# Patient Record
Sex: MALE | Race: BLACK OR AFRICAN AMERICAN | NOT HISPANIC OR LATINO | URBAN - METROPOLITAN AREA
[De-identification: names, ages, dates, MRNs, and addresses within clinical notes are randomized per-mention and may not be internally consistent; named-entity substitution may affect disease eponyms.]

---

## 2017-01-24 ENCOUNTER — INPATIENT (INPATIENT)
Facility: HOSPITAL | Age: 82
LOS: 4 days | Discharge: HOME HEALTH | DRG: 871 | End: 2017-01-29
Attending: FAMILY MEDICINE | Admitting: HOSPITALIST
Payer: MEDICARE

## 2017-01-24 VITALS
RESPIRATION RATE: 18 BRPM | DIASTOLIC BLOOD PRESSURE: 53 MMHG | HEART RATE: 56 BPM | TEMPERATURE: 99 F | WEIGHT: 162.04 LBS | SYSTOLIC BLOOD PRESSURE: 78 MMHG | OXYGEN SATURATION: 100 %

## 2017-01-24 LAB
ALBUMIN SERPL ELPH-MCNC: 3.5 G/DL — SIGNIFICANT CHANGE UP (ref 3.3–5.2)
ALP SERPL-CCNC: 92 U/L — SIGNIFICANT CHANGE UP (ref 40–120)
ALT FLD-CCNC: <5 U/L — SIGNIFICANT CHANGE UP
ANION GAP SERPL CALC-SCNC: 17 MMOL/L — SIGNIFICANT CHANGE UP (ref 5–17)
ANISOCYTOSIS BLD QL: SLIGHT — SIGNIFICANT CHANGE UP
APPEARANCE UR: ABNORMAL
AST SERPL-CCNC: 13 U/L — SIGNIFICANT CHANGE UP
BACTERIA # UR AUTO: ABNORMAL
BILIRUB SERPL-MCNC: 0.6 MG/DL — SIGNIFICANT CHANGE UP (ref 0.4–2)
BILIRUB UR-MCNC: NEGATIVE — SIGNIFICANT CHANGE UP
BUN SERPL-MCNC: 23 MG/DL — HIGH (ref 8–20)
CALCIUM SERPL-MCNC: 9.1 MG/DL — SIGNIFICANT CHANGE UP (ref 8.6–10.2)
CHLORIDE SERPL-SCNC: 93 MMOL/L — LOW (ref 98–107)
CK SERPL-CCNC: 46 U/L — SIGNIFICANT CHANGE UP (ref 30–200)
CO2 SERPL-SCNC: 31 MMOL/L — HIGH (ref 22–29)
COLOR SPEC: YELLOW — SIGNIFICANT CHANGE UP
CREAT SERPL-MCNC: 2.99 MG/DL — HIGH (ref 0.5–1.3)
DIFF PNL FLD: ABNORMAL
EOSINOPHIL NFR BLD AUTO: 2 % — SIGNIFICANT CHANGE UP (ref 0–6)
GLUCOSE SERPL-MCNC: 154 MG/DL — HIGH (ref 70–115)
GLUCOSE UR QL: NEGATIVE MG/DL — SIGNIFICANT CHANGE UP
HCT VFR BLD CALC: 30.8 % — LOW (ref 42–52)
HGB BLD-MCNC: 9.6 G/DL — LOW (ref 14–18)
HYPOCHROMIA BLD QL: SLIGHT — SIGNIFICANT CHANGE UP
INR BLD: 1.19 RATIO — HIGH (ref 0.88–1.16)
KETONES UR-MCNC: ABNORMAL
LEUKOCYTE ESTERASE UR-ACNC: ABNORMAL
LYMPHOCYTES # BLD AUTO: 19 % — LOW (ref 20–55)
MACROCYTES BLD QL: SLIGHT — SIGNIFICANT CHANGE UP
MCHC RBC-ENTMCNC: 31.2 G/DL — LOW (ref 32–36)
MCHC RBC-ENTMCNC: 31.2 PG — HIGH (ref 27–31)
MCV RBC AUTO: 100 FL — HIGH (ref 80–94)
MICROCYTES BLD QL: SLIGHT — SIGNIFICANT CHANGE UP
MONOCYTES NFR BLD AUTO: 10 % — SIGNIFICANT CHANGE UP (ref 3–10)
NEUTROPHILS NFR BLD AUTO: 45 % — SIGNIFICANT CHANGE UP (ref 37–73)
NITRITE UR-MCNC: NEGATIVE — SIGNIFICANT CHANGE UP
NRBC # BLD: 1 /100 — HIGH (ref 0–0)
PH UR: 8 — SIGNIFICANT CHANGE UP (ref 4.8–8)
PLAT MORPH BLD: NORMAL — SIGNIFICANT CHANGE UP
PLATELET # BLD AUTO: 155 K/UL — SIGNIFICANT CHANGE UP (ref 150–400)
POIKILOCYTOSIS BLD QL AUTO: SLIGHT — SIGNIFICANT CHANGE UP
POTASSIUM SERPL-MCNC: 3.9 MMOL/L — SIGNIFICANT CHANGE UP (ref 3.5–5.3)
POTASSIUM SERPL-SCNC: 3.9 MMOL/L — SIGNIFICANT CHANGE UP (ref 3.5–5.3)
PROT SERPL-MCNC: 6.9 G/DL — SIGNIFICANT CHANGE UP (ref 6.6–8.7)
PROT UR-MCNC: 100 MG/DL
PROTHROM AB SERPL-ACNC: 13.1 SEC — SIGNIFICANT CHANGE UP (ref 10–13.1)
RBC # BLD: 3.08 M/UL — LOW (ref 4.6–6.2)
RBC # FLD: 13.8 % — SIGNIFICANT CHANGE UP (ref 11–15.6)
RBC BLD AUTO: ABNORMAL
RBC CASTS # UR COMP ASSIST: ABNORMAL /HPF (ref 0–4)
SODIUM SERPL-SCNC: 141 MMOL/L — SIGNIFICANT CHANGE UP (ref 135–145)
SP GR SPEC: 1.01 — SIGNIFICANT CHANGE UP (ref 1.01–1.02)
TROPONIN T SERPL-MCNC: 0.04 NG/ML — SIGNIFICANT CHANGE UP (ref 0–0.06)
UROBILINOGEN FLD QL: 4 MG/DL
VARIANT LYMPHS # BLD: 24 % — HIGH (ref 0–6)
WBC # BLD: 23.36 K/UL — HIGH (ref 4.8–10.8)
WBC # FLD AUTO: 23.36 K/UL — HIGH (ref 4.8–10.8)
WBC UR QL: >50

## 2017-01-24 PROCEDURE — 71010: CPT | Mod: 26

## 2017-01-24 PROCEDURE — 99285 EMERGENCY DEPT VISIT HI MDM: CPT

## 2017-01-24 PROCEDURE — 70450 CT HEAD/BRAIN W/O DYE: CPT | Mod: 26

## 2017-01-24 RX ORDER — CEFTRIAXONE 500 MG/1
1 INJECTION, POWDER, FOR SOLUTION INTRAMUSCULAR; INTRAVENOUS EVERY 24 HOURS
Qty: 0 | Refills: 0 | Status: DISCONTINUED | OUTPATIENT
Start: 2017-01-25 | End: 2017-01-29

## 2017-01-24 RX ORDER — FOLIC ACID 0.8 MG
1 TABLET ORAL DAILY
Qty: 0 | Refills: 0 | Status: DISCONTINUED | OUTPATIENT
Start: 2017-01-24 | End: 2017-01-29

## 2017-01-24 RX ORDER — CEFTRIAXONE 500 MG/1
INJECTION, POWDER, FOR SOLUTION INTRAMUSCULAR; INTRAVENOUS
Qty: 0 | Refills: 0 | Status: DISCONTINUED | OUTPATIENT
Start: 2017-01-24 | End: 2017-01-29

## 2017-01-24 RX ORDER — ATORVASTATIN CALCIUM 80 MG/1
40 TABLET, FILM COATED ORAL AT BEDTIME
Qty: 0 | Refills: 0 | Status: DISCONTINUED | OUTPATIENT
Start: 2017-01-24 | End: 2017-01-29

## 2017-01-24 RX ORDER — FINASTERIDE 5 MG/1
5 TABLET, FILM COATED ORAL DAILY
Qty: 0 | Refills: 0 | Status: DISCONTINUED | OUTPATIENT
Start: 2017-01-24 | End: 2017-01-29

## 2017-01-24 RX ORDER — HEPARIN SODIUM 5000 [USP'U]/ML
5000 INJECTION INTRAVENOUS; SUBCUTANEOUS EVERY 8 HOURS
Qty: 0 | Refills: 0 | Status: DISCONTINUED | OUTPATIENT
Start: 2017-01-24 | End: 2017-01-29

## 2017-01-24 RX ORDER — CEFTRIAXONE 500 MG/1
1 INJECTION, POWDER, FOR SOLUTION INTRAMUSCULAR; INTRAVENOUS ONCE
Qty: 0 | Refills: 0 | Status: COMPLETED | OUTPATIENT
Start: 2017-01-24 | End: 2017-01-24

## 2017-01-24 RX ORDER — GLUCAGON INJECTION, SOLUTION 0.5 MG/.1ML
1 INJECTION, SOLUTION SUBCUTANEOUS ONCE
Qty: 0 | Refills: 0 | Status: DISCONTINUED | OUTPATIENT
Start: 2017-01-24 | End: 2017-01-29

## 2017-01-24 RX ORDER — SODIUM CHLORIDE 9 MG/ML
3 INJECTION INTRAMUSCULAR; INTRAVENOUS; SUBCUTANEOUS ONCE
Qty: 0 | Refills: 0 | Status: COMPLETED | OUTPATIENT
Start: 2017-01-24 | End: 2017-01-24

## 2017-01-24 RX ORDER — INSULIN LISPRO 100/ML
VIAL (ML) SUBCUTANEOUS
Qty: 0 | Refills: 0 | Status: DISCONTINUED | OUTPATIENT
Start: 2017-01-24 | End: 2017-01-29

## 2017-01-24 RX ORDER — ONDANSETRON 8 MG/1
4 TABLET, FILM COATED ORAL EVERY 6 HOURS
Qty: 0 | Refills: 0 | Status: DISCONTINUED | OUTPATIENT
Start: 2017-01-24 | End: 2017-01-29

## 2017-01-24 RX ORDER — SODIUM CHLORIDE 9 MG/ML
1000 INJECTION, SOLUTION INTRAVENOUS
Qty: 0 | Refills: 0 | Status: DISCONTINUED | OUTPATIENT
Start: 2017-01-24 | End: 2017-01-29

## 2017-01-24 RX ORDER — SODIUM CHLORIDE 9 MG/ML
3 INJECTION INTRAMUSCULAR; INTRAVENOUS; SUBCUTANEOUS EVERY 8 HOURS
Qty: 0 | Refills: 0 | Status: DISCONTINUED | OUTPATIENT
Start: 2017-01-24 | End: 2017-01-29

## 2017-01-24 RX ORDER — ASPIRIN/CALCIUM CARB/MAGNESIUM 324 MG
81 TABLET ORAL DAILY
Qty: 0 | Refills: 0 | Status: DISCONTINUED | OUTPATIENT
Start: 2017-01-24 | End: 2017-01-29

## 2017-01-24 RX ORDER — DEXTROSE 50 % IN WATER 50 %
12.5 SYRINGE (ML) INTRAVENOUS ONCE
Qty: 0 | Refills: 0 | Status: DISCONTINUED | OUTPATIENT
Start: 2017-01-24 | End: 2017-01-29

## 2017-01-24 RX ORDER — DEXTROSE 50 % IN WATER 50 %
25 SYRINGE (ML) INTRAVENOUS ONCE
Qty: 0 | Refills: 0 | Status: DISCONTINUED | OUTPATIENT
Start: 2017-01-24 | End: 2017-01-29

## 2017-01-24 RX ORDER — LACTOBACILLUS ACIDOPHILUS 100MM CELL
1 CAPSULE ORAL
Qty: 0 | Refills: 0 | Status: DISCONTINUED | OUTPATIENT
Start: 2017-01-24 | End: 2017-01-26

## 2017-01-24 RX ORDER — DEXTROSE 50 % IN WATER 50 %
1 SYRINGE (ML) INTRAVENOUS ONCE
Qty: 0 | Refills: 0 | Status: DISCONTINUED | OUTPATIENT
Start: 2017-01-24 | End: 2017-01-29

## 2017-01-24 RX ADMIN — SODIUM CHLORIDE 3 MILLILITER(S): 9 INJECTION INTRAMUSCULAR; INTRAVENOUS; SUBCUTANEOUS at 21:56

## 2017-01-24 NOTE — ED PROVIDER NOTE - OBJECTIVE STATEMENT
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 Family reports that pt was at dialysis today and was told to come to the ED because he was confused. Family reports that pt has needed more reminding lately and wakes up sporadically throughout the night forgetting what time of day it is. Last night, family reports that pt woke up at 2300 ready for dialysis. At present, pt denies fever, chills, N/V, SOB. During exam blood pressure is 118/56  PMD: Dr. Chan  dialysis: Dr. Varela 10 years TRS   Cardiology: Dr. Joel

## 2017-01-24 NOTE — ED PROVIDER NOTE - PSH
AV fistula infection    Av Fistula Occlusion    H/o Cataract    Insertion dialysis AV graft 3/14/12  right upper arm    left AV fistula with jugular vein bypass    Pacemaker
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
## 2017-01-24 NOTE — ED PROVIDER NOTE - NS ED MD SCRIBE ATTENDING SCRIBE SECTIONS
PHYSICAL EXAM/INTAKE ASSESSMENT/SCREENINGS/HISTORY OF PRESENT ILLNESS/REVIEW OF SYSTEMS/DISPOSITION/PAST MEDICAL/SURGICAL/SOCIAL HISTORY/HIV/VITAL SIGNS( Pullset)

## 2017-01-24 NOTE — ED PROVIDER NOTE - PMH
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
## 2017-01-25 DIAGNOSIS — N39.0 URINARY TRACT INFECTION, SITE NOT SPECIFIED: ICD-10-CM

## 2017-01-25 DIAGNOSIS — E78.5 HYPERLIPIDEMIA, UNSPECIFIED: ICD-10-CM

## 2017-01-25 DIAGNOSIS — N40.0 BENIGN PROSTATIC HYPERPLASIA WITHOUT LOWER URINARY TRACT SYMPTOMS: ICD-10-CM

## 2017-01-25 DIAGNOSIS — A41.9 SEPSIS, UNSPECIFIED ORGANISM: ICD-10-CM

## 2017-01-25 DIAGNOSIS — R41.82 ALTERED MENTAL STATUS, UNSPECIFIED: ICD-10-CM

## 2017-01-25 DIAGNOSIS — K21.9 GASTRO-ESOPHAGEAL REFLUX DISEASE WITHOUT ESOPHAGITIS: ICD-10-CM

## 2017-01-25 DIAGNOSIS — N18.6 END STAGE RENAL DISEASE: ICD-10-CM

## 2017-01-25 DIAGNOSIS — I15.1 HYPERTENSION SECONDARY TO OTHER RENAL DISORDERS: ICD-10-CM

## 2017-01-25 DIAGNOSIS — E11.29 TYPE 2 DIABETES MELLITUS WITH OTHER DIABETIC KIDNEY COMPLICATION: ICD-10-CM

## 2017-01-25 DIAGNOSIS — C85.90 NON-HODGKIN LYMPHOMA, UNSPECIFIED, UNSPECIFIED SITE: ICD-10-CM

## 2017-01-25 DIAGNOSIS — I10 ESSENTIAL (PRIMARY) HYPERTENSION: ICD-10-CM

## 2017-01-25 LAB
ANION GAP SERPL CALC-SCNC: 12 MMOL/L — SIGNIFICANT CHANGE UP (ref 5–17)
BASOPHILS # BLD AUTO: 0.1 K/UL — SIGNIFICANT CHANGE UP (ref 0–0.2)
BASOPHILS NFR BLD AUTO: 0.3 % — SIGNIFICANT CHANGE UP (ref 0–2)
BUN SERPL-MCNC: 28 MG/DL — HIGH (ref 8–20)
CALCIUM SERPL-MCNC: 8.3 MG/DL — LOW (ref 8.6–10.2)
CHLORIDE SERPL-SCNC: 98 MMOL/L — SIGNIFICANT CHANGE UP (ref 98–107)
CO2 SERPL-SCNC: 31 MMOL/L — HIGH (ref 22–29)
CREAT SERPL-MCNC: 3.85 MG/DL — HIGH (ref 0.5–1.3)
EOSINOPHIL # BLD AUTO: 0.2 K/UL — SIGNIFICANT CHANGE UP (ref 0–0.5)
EOSINOPHIL NFR BLD AUTO: 2 % — SIGNIFICANT CHANGE UP (ref 0–6)
GLUCOSE SERPL-MCNC: 100 MG/DL — SIGNIFICANT CHANGE UP (ref 70–115)
HBA1C BLD-MCNC: 6.3 % — HIGH (ref 4–5.6)
HCT VFR BLD CALC: 26.4 % — LOW (ref 42–52)
HGB BLD-MCNC: 8.2 G/DL — LOW (ref 14–18)
LACTATE BLDV-MCNC: 0.9 MMOL/L — SIGNIFICANT CHANGE UP (ref 0.7–2)
LYMPHOCYTES # BLD AUTO: 59 % — HIGH (ref 20–55)
MAGNESIUM SERPL-MCNC: 2.1 MG/DL — SIGNIFICANT CHANGE UP (ref 1.8–2.5)
MCHC RBC-ENTMCNC: 30.8 PG — SIGNIFICANT CHANGE UP (ref 27–31)
MCHC RBC-ENTMCNC: 31.1 G/DL — LOW (ref 32–36)
MCV RBC AUTO: 99.2 FL — HIGH (ref 80–94)
MONOCYTES NFR BLD AUTO: 9 % — SIGNIFICANT CHANGE UP (ref 3–10)
NEUTROPHILS NFR BLD AUTO: 15 % — LOW (ref 37–73)
PHOSPHATE SERPL-MCNC: 3.5 MG/DL — SIGNIFICANT CHANGE UP (ref 2.4–4.7)
PLAT MORPH BLD: NORMAL — SIGNIFICANT CHANGE UP
PLATELET # BLD AUTO: 138 K/UL — LOW (ref 150–400)
POTASSIUM SERPL-MCNC: 3.7 MMOL/L — SIGNIFICANT CHANGE UP (ref 3.5–5.3)
POTASSIUM SERPL-SCNC: 3.7 MMOL/L — SIGNIFICANT CHANGE UP (ref 3.5–5.3)
RBC # BLD: 2.66 M/UL — LOW (ref 4.6–6.2)
RBC # FLD: 14 % — SIGNIFICANT CHANGE UP (ref 11–15.6)
RBC BLD AUTO: ABNORMAL
SODIUM SERPL-SCNC: 141 MMOL/L — SIGNIFICANT CHANGE UP (ref 135–145)
VARIANT LYMPHS # BLD: 15 % — HIGH (ref 0–6)
WBC # BLD: 23.9 K/UL — HIGH (ref 4.8–10.8)
WBC # FLD AUTO: 23.9 K/UL — HIGH (ref 4.8–10.8)

## 2017-01-25 PROCEDURE — 99233 SBSQ HOSP IP/OBS HIGH 50: CPT

## 2017-01-25 PROCEDURE — 93010 ELECTROCARDIOGRAM REPORT: CPT

## 2017-01-25 RX ADMIN — CEFTRIAXONE 100 GRAM(S): 500 INJECTION, POWDER, FOR SOLUTION INTRAMUSCULAR; INTRAVENOUS at 23:42

## 2017-01-25 RX ADMIN — Medication 2: at 12:41

## 2017-01-25 RX ADMIN — SODIUM CHLORIDE 3 MILLILITER(S): 9 INJECTION INTRAMUSCULAR; INTRAVENOUS; SUBCUTANEOUS at 21:47

## 2017-01-25 RX ADMIN — HEPARIN SODIUM 5000 UNIT(S): 5000 INJECTION INTRAVENOUS; SUBCUTANEOUS at 14:58

## 2017-01-25 RX ADMIN — SODIUM CHLORIDE 3 MILLILITER(S): 9 INJECTION INTRAMUSCULAR; INTRAVENOUS; SUBCUTANEOUS at 15:00

## 2017-01-25 RX ADMIN — CEFTRIAXONE 100 GRAM(S): 500 INJECTION, POWDER, FOR SOLUTION INTRAMUSCULAR; INTRAVENOUS at 00:22

## 2017-01-25 RX ADMIN — Medication 1 MILLIGRAM(S): at 11:43

## 2017-01-25 RX ADMIN — Medication 1 TABLET(S): at 10:04

## 2017-01-25 RX ADMIN — FINASTERIDE 5 MILLIGRAM(S): 5 TABLET, FILM COATED ORAL at 11:43

## 2017-01-25 RX ADMIN — HEPARIN SODIUM 5000 UNIT(S): 5000 INJECTION INTRAVENOUS; SUBCUTANEOUS at 23:38

## 2017-01-25 RX ADMIN — Medication 1 TABLET(S): at 18:41

## 2017-01-25 RX ADMIN — SODIUM CHLORIDE 3 MILLILITER(S): 9 INJECTION INTRAMUSCULAR; INTRAVENOUS; SUBCUTANEOUS at 05:30

## 2017-01-25 RX ADMIN — Medication 81 MILLIGRAM(S): at 11:43

## 2017-01-25 RX ADMIN — ATORVASTATIN CALCIUM 40 MILLIGRAM(S): 80 TABLET, FILM COATED ORAL at 23:39

## 2017-01-25 RX ADMIN — HEPARIN SODIUM 5000 UNIT(S): 5000 INJECTION INTRAVENOUS; SUBCUTANEOUS at 05:53

## 2017-01-25 NOTE — CONSULT NOTE ADULT - ASSESSMENT
ESRD on HD has been tolerating ok on TTS schedule  I obtained consent in ED no need for HD today will arrange for HD yasmany  BP on low side will be cautious with UF removal  Electrolytes volume status ok  Confusion possible due to UTI but has worsening   for past 3-4  wks would consider Neuro eval

## 2017-01-25 NOTE — ED ADULT NURSE REASSESSMENT NOTE - NS ED NURSE REASSESS COMMENT FT1
IV placed, bloods sent to lab, 1 liter of NS infusing-Low BP, patient placed on cardiac monitor, will continue to monitor.
Pt resting comfortably on stretcher, resp even and unlabored, color good, showing NSR on monitor with occasional PVC's, MD Wagner aware, no intervention at this time, denies pain, offers no complaints, pt aware of plan of care, awaiting admit bed, will continue to monitor
Pt with patent AV fistula in right upper extremity, positive bruit/thrill present, no edema present in lower extremities, pt showing NSR on monitor, family at bedside, pt and family aware of plan of care, will continue to monitor
Pt received A&OX3,denies pain.  VSS.  CM in place.  NSR.  Clear bsb, abd soft nondistended, nontender, moving all ext well.  RAVF, positive bruit & thrill.  SL in LAC, patent, dsg dry and intact.

## 2017-01-25 NOTE — H&P ADULT. - ASSESSMENT
88 y/o male with UTI, sepsis, intermittent confusion, ESRD on HD, HTN, HLD, DM, RHD, GERD, Prostate cancer, CLL

## 2017-01-25 NOTE — PROGRESS NOTE ADULT - SUBJECTIVE AND OBJECTIVE BOX
INTERVAL HPI/OVERNIGHT EVENTS:    CC: sepsis secondary to UTI, ESRD on HD    Chart and course reviewed, admitted for confusion noted by family. At this time AO x3, denies complaints. Reports having HD yesterday.     Vital Signs Last 24 Hrs  T(C): 36.9, Max: 37.1 ( @ 17:33)  T(F): 98.5, Max: 98.8 ( @ 01:00)  HR: 97 (56 - 97)  BP: 104/53 (78/53 - 123/60)  BP(mean): --  RR: 20 (18 - 20)  SpO2: 98% (95% - 100%)    PHYSICAL EXAM:    GENERAL: Not in distress. alert  CHEST/LUNG: b/l air entry, clear  HEART: Regular  ABDOMEN: Soft, BS+  EXTREMITIES:  No edema    MEDICATIONS  (STANDING):  heparin  Injectable 5000Unit(s) SubCutaneous every 8 hours  sodium chloride 0.9% lock flush 3milliLiter(s) IV Push every 8 hours  insulin lispro (HumaLOG) corrective regimen sliding scale  SubCutaneous Before meals and at bedtime  dextrose 5%. 1000milliLiter(s) IV Continuous <Continuous>  dextrose 50% Injectable 12.5Gram(s) IV Push once  dextrose 50% Injectable 25Gram(s) IV Push once  dextrose 50% Injectable 25Gram(s) IV Push once  cefTRIAXone   IVPB  IV Intermittent   lactobacillus acidophilus 1Tablet(s) Oral two times a day with meals  cefTRIAXone   IVPB 1Gram(s) IV Intermittent every 24 hours  atorvastatin 40milliGRAM(s) Oral at bedtime  finasteride 5milliGRAM(s) Oral daily  aspirin enteric coated 81milliGRAM(s) Oral daily  folic acid 1milliGRAM(s) Oral daily    MEDICATIONS  (PRN):  ondansetron Injectable 4milliGRAM(s) IV Push every 6 hours PRN Nausea  dextrose Gel 1Dose(s) Oral once PRN Blood Glucose LESS THAN 70 milliGRAM(s)/deciliter  glucagon  Injectable 1milliGRAM(s) IntraMuscular once PRN Glucose LESS THAN 70 milligrams/deciliter      Allergies    No Known Allergies    Intolerances          LABS:                          8.2    23.90 )-----------( 138      ( 2017 05:38 )             26.4     2017 05:38    141    |  98     |  28.0   ----------------------------<  100    3.7     |  31.0   |  3.85     Ca    8.3        2017 05:38  Phos  3.5       2017 05:38  Mg     2.1       2017 05:38    TPro  6.9    /  Alb  3.5    /  TBili  0.6    /  DBili  x      /  AST  13     /  ALT  <5     /  AlkPhos  92     2017 20:17    PT/INR - ( 2017 20:17 )   PT: 13.1 sec;   INR: 1.19 ratio           Urinalysis Basic - ( 2017 23:13 )    Color: Yellow / Appearance: very cloudy / S.010 / pH: x  Gluc: x / Ketone: Trace  / Bili: Negative / Urobili: 4 mg/dL   Blood: x / Protein: 100 mg/dL / Nitrite: Negative   Leuk Esterase: Moderate / RBC: 11-25 /HPF / WBC >50   Sq Epi: x / Non Sq Epi: x / Bacteria: TNTC        RADIOLOGY & ADDITIONAL TESTS:

## 2017-01-25 NOTE — H&P ADULT. - NEUROLOGICAL DETAILS
responds to verbal commands/alert and oriented x 3/sensation intact/cranial nerves intact/responds to pain
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
## 2017-01-25 NOTE — PROGRESS NOTE ADULT - ASSESSMENT
89 yr old male with  RHD, s/p PPM, ESRD on HD via right UE AVG, HTN, HLD, DM-2, Prostate cancer s/p XRT in past, CLL admitted for confusion, noted to have sepsis secondary to UTI. He was hypotensive in the ED and received fluid boluses, BP improved post bolus. Cultures sent.

## 2017-01-26 DIAGNOSIS — R41.82 ALTERED MENTAL STATUS, UNSPECIFIED: ICD-10-CM

## 2017-01-26 LAB
ANION GAP SERPL CALC-SCNC: 14 MMOL/L — SIGNIFICANT CHANGE UP (ref 5–17)
BUN SERPL-MCNC: 39 MG/DL — HIGH (ref 8–20)
CALCIUM SERPL-MCNC: 8.4 MG/DL — LOW (ref 8.6–10.2)
CHLORIDE SERPL-SCNC: 96 MMOL/L — LOW (ref 98–107)
CO2 SERPL-SCNC: 28 MMOL/L — SIGNIFICANT CHANGE UP (ref 22–29)
CREAT SERPL-MCNC: 4.98 MG/DL — HIGH (ref 0.5–1.3)
GLUCOSE SERPL-MCNC: 172 MG/DL — HIGH (ref 70–115)
HCT VFR BLD CALC: 25.8 % — LOW (ref 42–52)
HGB BLD-MCNC: 8 G/DL — LOW (ref 14–18)
IRON SATN MFR SERPL: 25 % — SIGNIFICANT CHANGE UP (ref 16–55)
IRON SATN MFR SERPL: 38 UG/DL — LOW (ref 59–158)
MAGNESIUM SERPL-MCNC: 2 MG/DL — SIGNIFICANT CHANGE UP (ref 1.8–2.5)
MCHC RBC-ENTMCNC: 31 G/DL — LOW (ref 32–36)
MCHC RBC-ENTMCNC: 31.3 PG — HIGH (ref 27–31)
MCV RBC AUTO: 100.8 FL — HIGH (ref 80–94)
PHOSPHATE SERPL-MCNC: 4 MG/DL — SIGNIFICANT CHANGE UP (ref 2.4–4.7)
PLATELET # BLD AUTO: 120 K/UL — LOW (ref 150–400)
POTASSIUM SERPL-MCNC: 4 MMOL/L — SIGNIFICANT CHANGE UP (ref 3.5–5.3)
POTASSIUM SERPL-SCNC: 4 MMOL/L — SIGNIFICANT CHANGE UP (ref 3.5–5.3)
RBC # BLD: 2.56 M/UL — LOW (ref 4.6–6.2)
RBC # FLD: 13.4 % — SIGNIFICANT CHANGE UP (ref 11–15.6)
SODIUM SERPL-SCNC: 138 MMOL/L — SIGNIFICANT CHANGE UP (ref 135–145)
T3 SERPL-MCNC: 63 NG/DL — LOW (ref 80–200)
T4 AB SER-ACNC: 6 UG/DL — SIGNIFICANT CHANGE UP (ref 4.5–12)
TIBC SERPL-MCNC: 150 UG/DL — LOW (ref 220–430)
TRANSFERRIN SERPL-MCNC: 105 MG/DL — LOW (ref 180–329)
TSH SERPL-MCNC: 1.37 UIU/ML — SIGNIFICANT CHANGE UP (ref 0.27–4.2)
VIT B12 SERPL-MCNC: 705 PG/ML — SIGNIFICANT CHANGE UP (ref 180–914)
WBC # BLD: 21.51 K/UL — HIGH (ref 4.8–10.8)
WBC # FLD AUTO: 21.51 K/UL — HIGH (ref 4.8–10.8)

## 2017-01-26 PROCEDURE — 99233 SBSQ HOSP IP/OBS HIGH 50: CPT

## 2017-01-26 RX ORDER — SACCHAROMYCES BOULARDII 250 MG
250 POWDER IN PACKET (EA) ORAL
Qty: 0 | Refills: 0 | Status: DISCONTINUED | OUTPATIENT
Start: 2017-01-26 | End: 2017-01-27

## 2017-01-26 RX ORDER — ERYTHROPOIETIN 10000 [IU]/ML
4000 INJECTION, SOLUTION INTRAVENOUS; SUBCUTANEOUS
Qty: 0 | Refills: 0 | Status: DISCONTINUED | OUTPATIENT
Start: 2017-01-26 | End: 2017-01-29

## 2017-01-26 RX ADMIN — Medication 250 MILLIGRAM(S): at 22:46

## 2017-01-26 RX ADMIN — HEPARIN SODIUM 5000 UNIT(S): 5000 INJECTION INTRAVENOUS; SUBCUTANEOUS at 14:58

## 2017-01-26 RX ADMIN — Medication 1 TABLET(S): at 09:32

## 2017-01-26 RX ADMIN — Medication 2: at 11:29

## 2017-01-26 RX ADMIN — SODIUM CHLORIDE 3 MILLILITER(S): 9 INJECTION INTRAMUSCULAR; INTRAVENOUS; SUBCUTANEOUS at 14:59

## 2017-01-26 RX ADMIN — CEFTRIAXONE 100 GRAM(S): 500 INJECTION, POWDER, FOR SOLUTION INTRAMUSCULAR; INTRAVENOUS at 22:56

## 2017-01-26 RX ADMIN — Medication 1 MILLIGRAM(S): at 11:31

## 2017-01-26 RX ADMIN — HEPARIN SODIUM 5000 UNIT(S): 5000 INJECTION INTRAVENOUS; SUBCUTANEOUS at 22:46

## 2017-01-26 RX ADMIN — SODIUM CHLORIDE 3 MILLILITER(S): 9 INJECTION INTRAMUSCULAR; INTRAVENOUS; SUBCUTANEOUS at 22:48

## 2017-01-26 RX ADMIN — ERYTHROPOIETIN 4000 UNIT(S): 10000 INJECTION, SOLUTION INTRAVENOUS; SUBCUTANEOUS at 18:58

## 2017-01-26 RX ADMIN — SODIUM CHLORIDE 3 MILLILITER(S): 9 INJECTION INTRAMUSCULAR; INTRAVENOUS; SUBCUTANEOUS at 05:38

## 2017-01-26 RX ADMIN — FINASTERIDE 5 MILLIGRAM(S): 5 TABLET, FILM COATED ORAL at 11:31

## 2017-01-26 RX ADMIN — ATORVASTATIN CALCIUM 40 MILLIGRAM(S): 80 TABLET, FILM COATED ORAL at 22:46

## 2017-01-26 RX ADMIN — Medication 81 MILLIGRAM(S): at 11:31

## 2017-01-26 RX ADMIN — HEPARIN SODIUM 5000 UNIT(S): 5000 INJECTION INTRAVENOUS; SUBCUTANEOUS at 05:38

## 2017-01-26 NOTE — PHYSICAL THERAPY INITIAL EVALUATION ADULT - ADDITIONAL COMMENTS
Pt lives with dtr. in a 2 story house with 2 steps to enter.  Amb with SAC, independent with all self care and ADLs PTA.

## 2017-01-26 NOTE — PROGRESS NOTE ADULT - SUBJECTIVE AND OBJECTIVE BOX
INTERVAL HPI/OVERNIGHT EVENTS:      CC: UTI, ESRD on HD    No overnight events, no complaints. Spoke with nephrologist, per him patient has had episodes of confusion over the last 3 weeks. No fever.     Vital Signs Last 24 Hrs  T(C): 36.4, Max: 37.1 ( @ 14:09)  T(F): 97.6, Max: 98.7 ( @ 14:09)  HR: 74 (71 - 77)  BP: 115/45 (115/45 - 126/62)  BP(mean): --  RR: 17 (16 - 19)  SpO2: 97% (96% - 97%)    PHYSICAL EXAM:    GENERAL: Not in distress, alert   CHEST/LUNG: b/l air entry, clear  HEART: Regular  ABDOMEN: Soft, BS +  EXTREMITIES:  No edema    MEDICATIONS  (STANDING):  heparin  Injectable 5000Unit(s) SubCutaneous every 8 hours  sodium chloride 0.9% lock flush 3milliLiter(s) IV Push every 8 hours  insulin lispro (HumaLOG) corrective regimen sliding scale  SubCutaneous Before meals and at bedtime  dextrose 5%. 1000milliLiter(s) IV Continuous <Continuous>  dextrose 50% Injectable 12.5Gram(s) IV Push once  dextrose 50% Injectable 25Gram(s) IV Push once  dextrose 50% Injectable 25Gram(s) IV Push once  cefTRIAXone   IVPB  IV Intermittent   lactobacillus acidophilus 1Tablet(s) Oral two times a day with meals  cefTRIAXone   IVPB 1Gram(s) IV Intermittent every 24 hours  atorvastatin 40milliGRAM(s) Oral at bedtime  finasteride 5milliGRAM(s) Oral daily  aspirin enteric coated 81milliGRAM(s) Oral daily  folic acid 1milliGRAM(s) Oral daily    MEDICATIONS  (PRN):  ondansetron Injectable 4milliGRAM(s) IV Push every 6 hours PRN Nausea  dextrose Gel 1Dose(s) Oral once PRN Blood Glucose LESS THAN 70 milliGRAM(s)/deciliter  glucagon  Injectable 1milliGRAM(s) IntraMuscular once PRN Glucose LESS THAN 70 milligrams/deciliter      Allergies    No Known Allergies    Intolerances          LABS:                          8.2    23.90 )-----------( 138      ( 2017 05:38 )             26.4     2017 05:38    141    |  98     |  28.0   ----------------------------<  100    3.7     |  31.0   |  3.85     Ca    8.3        2017 05:38  Phos  3.5       2017 05:38  Mg     2.1       2017 05:38    TPro  6.9    /  Alb  3.5    /  TBili  0.6    /  DBili  x      /  AST  13     /  ALT  <5     /  AlkPhos  92     2017 20:17    PT/INR - ( 2017 20:17 )   PT: 13.1 sec;   INR: 1.19 ratio           Urinalysis Basic - ( 2017 23:13 )    Color: Yellow / Appearance: very cloudy / S.010 / pH: x  Gluc: x / Ketone: Trace  / Bili: Negative / Urobili: 4 mg/dL   Blood: x / Protein: 100 mg/dL / Nitrite: Negative   Leuk Esterase: Moderate / RBC: 11-25 /HPF / WBC >50   Sq Epi: x / Non Sq Epi: x / Bacteria: TNTC        RADIOLOGY & ADDITIONAL TESTS:

## 2017-01-26 NOTE — PROGRESS NOTE ADULT - ASSESSMENT
ESRD on HD has been tolerating ok on TTS schedule  I obtained consent in ED no need for HD jared will arrange for HD today  BP had been on low side will be cautious with UF removal  Electrolytes volume status ok  UTI on Rocephin  Confusion possible due to UTI but has worsening   for past 3-4  wks would consider Neuro eval d/w hospitalist  Anemia 2/2 ESRD at outpt center received 50mcg mircera Q 2wks last dose was tues  will give epo with HD and check iron studies

## 2017-01-26 NOTE — PROGRESS NOTE ADULT - SUBJECTIVE AND OBJECTIVE BOX
NEPHROLOGY INTERVAL HPI/OVERNIGHT EVENT    Examined earlier  comfortable no complaints    MEDICATIONS  (STANDING):  heparin  Injectable 5000Unit(s) SubCutaneous every 8 hours  sodium chloride 0.9% lock flush 3milliLiter(s) IV Push every 8 hours  insulin lispro (HumaLOG) corrective regimen sliding scale  SubCutaneous Before meals and at bedtime  dextrose 5%. 1000milliLiter(s) IV Continuous <Continuous>  dextrose 50% Injectable 12.5Gram(s) IV Push once  dextrose 50% Injectable 25Gram(s) IV Push once  dextrose 50% Injectable 25Gram(s) IV Push once  cefTRIAXone   IVPB  IV Intermittent   lactobacillus acidophilus 1Tablet(s) Oral two times a day with meals  cefTRIAXone   IVPB 1Gram(s) IV Intermittent every 24 hours  atorvastatin 40milliGRAM(s) Oral at bedtime  finasteride 5milliGRAM(s) Oral daily  aspirin enteric coated 81milliGRAM(s) Oral daily  folic acid 1milliGRAM(s) Oral daily    MEDICATIONS  (PRN):  ondansetron Injectable 4milliGRAM(s) IV Push every 6 hours PRN Nausea  dextrose Gel 1Dose(s) Oral once PRN Blood Glucose LESS THAN 70 milliGRAM(s)/deciliter  glucagon  Injectable 1milliGRAM(s) IntraMuscular once PRN Glucose LESS THAN 70 milligrams/deciliter      Allergies    No Known Allergies    Intolerances        Vital Signs Last 24 Hrs  T(C): 36.4, Max: 37.1 ( @ 14:09)  T(F): 97.6, Max: 98.7 ( @ 14:09)  HR: 74 (71 - 77)  BP: 115/45 (115/45 - 126/62)  BP(mean): --  RR: 17 (16 - 19)  SpO2: 97% (96% - 97%)  Daily     Daily     PHYSICAL EXAM:    GENERAL: NAD, well-groomed, well-developed  HEAD:  Atraumatic, Normocephalic  EYES: EOMI, PERRLA, conjunctiva and sclera clear  ENMT: No tonsillar erythema, exudates, or enlargement; Moist mucous membranes, Good dentition, No lesions  NECK: Supple, No JVD, Normal thyroid  NERVOUS SYSTEM:  Alert & Oriented X3, Good concentration; Motor Strength 5/5 B/L upper and lower extremities; DTRs 2+ intact and symmetric  CHEST/LUNG: Clear to percussion bilaterally; No rales, rhonchi, wheezing, or rubs  HEART: Regular rate and rhythm; No murmurs, rubs, or gallops  ABDOMEN: Soft, Nontender, Nondistended; Bowel sounds present  EXTREMITIES:  2+ Peripheral Pulses, No clubbing, cyanosis, or edema  SKIN: No rashes or lesions    LABS:                        8.0    21.51 )-----------( 120      ( 2017 11:31 )             25.8     2017 05:38    141    |  98     |  28.0   ----------------------------<  100    3.7     |  31.0   |  3.85     Ca    8.3        2017 05:38  Phos  3.5       2017 05:38  Mg     2.1       2017 05:38    TPro  6.9    /  Alb  3.5    /  TBili  0.6    /  DBili  x      /  AST  13     /  ALT  <5     /  AlkPhos  92     2017 20:17    PT/INR - ( 2017 20:17 )   PT: 13.1 sec;   INR: 1.19 ratio           Urinalysis Basic - ( 2017 23:13 )    Color: Yellow / Appearance: very cloudy / S.010 / pH: x  Gluc: x / Ketone: Trace  / Bili: Negative / Urobili: 4 mg/dL   Blood: x / Protein: 100 mg/dL / Nitrite: Negative   Leuk Esterase: Moderate / RBC: 11-25 /HPF / WBC >50   Sq Epi: x / Non Sq Epi: x / Bacteria: TNTC              RADIOLOGY & ADDITIONAL TESTS:

## 2017-01-27 LAB
CRP SERPL-MCNC: 5.4 MG/DL — HIGH (ref 0–0.4)
ERYTHROCYTE [SEDIMENTATION RATE] IN BLOOD: 47 MM/HR — HIGH (ref 0–20)
HBV SURFACE AG SER-ACNC: SIGNIFICANT CHANGE UP
HCV AB S/CO SERPL IA: 0.12 S/CO — SIGNIFICANT CHANGE UP
HCV AB SERPL-IMP: SIGNIFICANT CHANGE UP

## 2017-01-27 PROCEDURE — 99233 SBSQ HOSP IP/OBS HIGH 50: CPT

## 2017-01-27 RX ORDER — ATORVASTATIN CALCIUM 80 MG/1
1 TABLET, FILM COATED ORAL
Qty: 0 | Refills: 0 | COMMUNITY

## 2017-01-27 RX ORDER — FOLIC ACID 0.8 MG
1 TABLET ORAL
Qty: 0 | Refills: 0 | COMMUNITY

## 2017-01-27 RX ORDER — FINASTERIDE 5 MG/1
0 TABLET, FILM COATED ORAL
Qty: 0 | Refills: 0 | COMMUNITY

## 2017-01-27 RX ORDER — ASPIRIN/CALCIUM CARB/MAGNESIUM 324 MG
1 TABLET ORAL
Qty: 0 | Refills: 0 | COMMUNITY

## 2017-01-27 RX ORDER — FOLIC ACID 0.8 MG
0 TABLET ORAL
Qty: 0 | Refills: 0 | COMMUNITY

## 2017-01-27 RX ORDER — METOPROLOL TARTRATE 50 MG
1 TABLET ORAL
Qty: 0 | Refills: 0 | COMMUNITY

## 2017-01-27 RX ADMIN — Medication 250 MILLIGRAM(S): at 05:32

## 2017-01-27 RX ADMIN — SODIUM CHLORIDE 3 MILLILITER(S): 9 INJECTION INTRAMUSCULAR; INTRAVENOUS; SUBCUTANEOUS at 05:31

## 2017-01-27 RX ADMIN — HEPARIN SODIUM 5000 UNIT(S): 5000 INJECTION INTRAVENOUS; SUBCUTANEOUS at 14:52

## 2017-01-27 RX ADMIN — Medication 1 MILLIGRAM(S): at 11:54

## 2017-01-27 RX ADMIN — Medication 81 MILLIGRAM(S): at 11:54

## 2017-01-27 RX ADMIN — Medication 4: at 11:53

## 2017-01-27 RX ADMIN — ATORVASTATIN CALCIUM 40 MILLIGRAM(S): 80 TABLET, FILM COATED ORAL at 22:19

## 2017-01-27 RX ADMIN — SODIUM CHLORIDE 3 MILLILITER(S): 9 INJECTION INTRAMUSCULAR; INTRAVENOUS; SUBCUTANEOUS at 15:42

## 2017-01-27 RX ADMIN — FINASTERIDE 5 MILLIGRAM(S): 5 TABLET, FILM COATED ORAL at 11:54

## 2017-01-27 RX ADMIN — SODIUM CHLORIDE 3 MILLILITER(S): 9 INJECTION INTRAMUSCULAR; INTRAVENOUS; SUBCUTANEOUS at 22:20

## 2017-01-27 RX ADMIN — HEPARIN SODIUM 5000 UNIT(S): 5000 INJECTION INTRAVENOUS; SUBCUTANEOUS at 22:19

## 2017-01-27 RX ADMIN — HEPARIN SODIUM 5000 UNIT(S): 5000 INJECTION INTRAVENOUS; SUBCUTANEOUS at 05:32

## 2017-01-27 RX ADMIN — CEFTRIAXONE 100 GRAM(S): 500 INJECTION, POWDER, FOR SOLUTION INTRAMUSCULAR; INTRAVENOUS at 22:13

## 2017-01-27 RX ADMIN — Medication 2: at 16:45
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
## 2017-01-27 NOTE — PROGRESS NOTE ADULT - ATTENDING COMMENTS
Pt seen and examined with NP. A&P reviewed.   F/u Cx  Discussed with daughter at bedside
Discussed with patient.
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
## 2017-01-28 LAB
ANION GAP SERPL CALC-SCNC: 12 MMOL/L — SIGNIFICANT CHANGE UP (ref 5–17)
BUN SERPL-MCNC: 32 MG/DL — HIGH (ref 8–20)
CALCIUM SERPL-MCNC: 8.5 MG/DL — LOW (ref 8.6–10.2)
CHLORIDE SERPL-SCNC: 96 MMOL/L — LOW (ref 98–107)
CO2 SERPL-SCNC: 27 MMOL/L — SIGNIFICANT CHANGE UP (ref 22–29)
CREAT SERPL-MCNC: 4.49 MG/DL — HIGH (ref 0.5–1.3)
CULTURE RESULTS: SIGNIFICANT CHANGE UP
GLUCOSE SERPL-MCNC: 188 MG/DL — HIGH (ref 70–115)
HCT VFR BLD CALC: 27.3 % — LOW (ref 42–52)
HGB BLD-MCNC: 8.5 G/DL — LOW (ref 14–18)
MCHC RBC-ENTMCNC: 31.1 G/DL — LOW (ref 32–36)
MCHC RBC-ENTMCNC: 31.3 PG — HIGH (ref 27–31)
MCV RBC AUTO: 100.4 FL — HIGH (ref 80–94)
PHOSPHATE SERPL-MCNC: 2.9 MG/DL — SIGNIFICANT CHANGE UP (ref 2.4–4.7)
PLATELET # BLD AUTO: 115 K/UL — LOW (ref 150–400)
POTASSIUM SERPL-MCNC: 4.2 MMOL/L — SIGNIFICANT CHANGE UP (ref 3.5–5.3)
POTASSIUM SERPL-SCNC: 4.2 MMOL/L — SIGNIFICANT CHANGE UP (ref 3.5–5.3)
RBC # BLD: 2.72 M/UL — LOW (ref 4.6–6.2)
RBC # FLD: 14.2 % — SIGNIFICANT CHANGE UP (ref 11–15.6)
SODIUM SERPL-SCNC: 135 MMOL/L — SIGNIFICANT CHANGE UP (ref 135–145)
SPECIMEN SOURCE: SIGNIFICANT CHANGE UP
WBC # BLD: 18.22 K/UL — HIGH (ref 4.8–10.8)
WBC # FLD AUTO: 18.22 K/UL — HIGH (ref 4.8–10.8)

## 2017-01-28 PROCEDURE — 99232 SBSQ HOSP IP/OBS MODERATE 35: CPT

## 2017-01-28 RX ADMIN — SODIUM CHLORIDE 3 MILLILITER(S): 9 INJECTION INTRAMUSCULAR; INTRAVENOUS; SUBCUTANEOUS at 06:56

## 2017-01-28 RX ADMIN — Medication 1 MILLIGRAM(S): at 11:40

## 2017-01-28 RX ADMIN — SODIUM CHLORIDE 3 MILLILITER(S): 9 INJECTION INTRAMUSCULAR; INTRAVENOUS; SUBCUTANEOUS at 17:28

## 2017-01-28 RX ADMIN — HEPARIN SODIUM 5000 UNIT(S): 5000 INJECTION INTRAVENOUS; SUBCUTANEOUS at 22:21

## 2017-01-28 RX ADMIN — HEPARIN SODIUM 5000 UNIT(S): 5000 INJECTION INTRAVENOUS; SUBCUTANEOUS at 06:56

## 2017-01-28 RX ADMIN — CEFTRIAXONE 100 GRAM(S): 500 INJECTION, POWDER, FOR SOLUTION INTRAMUSCULAR; INTRAVENOUS at 22:20

## 2017-01-28 RX ADMIN — ATORVASTATIN CALCIUM 40 MILLIGRAM(S): 80 TABLET, FILM COATED ORAL at 22:20

## 2017-01-28 RX ADMIN — Medication 4: at 11:40

## 2017-01-28 RX ADMIN — FINASTERIDE 5 MILLIGRAM(S): 5 TABLET, FILM COATED ORAL at 11:40

## 2017-01-28 RX ADMIN — ERYTHROPOIETIN 4000 UNIT(S): 10000 INJECTION, SOLUTION INTRAVENOUS; SUBCUTANEOUS at 14:17

## 2017-01-28 RX ADMIN — SODIUM CHLORIDE 3 MILLILITER(S): 9 INJECTION INTRAMUSCULAR; INTRAVENOUS; SUBCUTANEOUS at 22:18

## 2017-01-28 RX ADMIN — Medication 81 MILLIGRAM(S): at 11:40

## 2017-01-28 NOTE — PROGRESS NOTE ADULT - PROBLEM SELECTOR PLAN 3
Accu check, sliding scale coverage.

## 2017-01-28 NOTE — PROGRESS NOTE ADULT - PROBLEM SELECTOR PLAN 7
likely acute metabolic encephalopathy due to UTI on top of progression of underlying dementia. CT unremarkable. Has PPM, will check EEG.
likely acute metabolic encephalopathy due to UTI on top of progression of underlying dementia. CT unremarkable. Has PPM, will check EEG.
CT unremarkable. Has PPM, will check EEG. Neurology evaluation.

## 2017-01-28 NOTE — PROGRESS NOTE ADULT - PROBLEM SELECTOR PROBLEM 3
Type 2 diabetes mellitus with other kidney complication

## 2017-01-28 NOTE — PROGRESS NOTE ADULT - PROBLEM SELECTOR PLAN 2
HD to be continued per schedule. Nephrology eval noted.
Will consult nephrology for HD to be continued per schedule T/TH/Sat

## 2017-01-28 NOTE — PROGRESS NOTE ADULT - PROBLEM SELECTOR PROBLEM 2
ESRD (end stage renal disease)

## 2017-01-28 NOTE — PROGRESS NOTE ADULT - SUBJECTIVE AND OBJECTIVE BOX
Patient was seen and evaluated on dialysis.   No c/o CP SOB NV  no F/C    T(C): 36.8, Max: 37.2 (01-27 @ 16:15)  HR: 73 (73 - 84)  BP: 117/50 (106/61 - 135/60)  Wt(kg): --  PE ;  NAD  lungs - CTA  CV gr  murmer, No gallop or rub  Abd : soft, NT BS +, No masses  Ext- No edema  Neuro : Grossly intact, moving extremities; not confused at present       28 Jan 2017 12:44    135    |  96     |  32.0   ----------------------------<  188    4.2     |  27.0   |  4.49     Ca    8.5        28 Jan 2017 12:44  Phos  2.9       28 Jan 2017 12:44        MEDICATIONS  (STANDING):  heparin  Injectable  sodium chloride 0.9% lock flush  ondansetron Injectable PRN  insulin lispro (HumaLOG) corrective regimen sliding scale  dextrose 5%.  dextrose Gel PRN  dextrose 50% Injectable  dextrose 50% Injectable  dextrose 50% Injectable  glucagon  Injectable PRN  cefTRIAXone   IVPB  cefTRIAXone   IVPB  atorvastatin  finasteride  aspirin enteric coated  folic acid  epoetin sylvie Injectable      Patient stable  [facility] HD easily  Continue

## 2017-01-28 NOTE — PROGRESS NOTE ADULT - PROBLEM SELECTOR PROBLEM 4
Hyperlipidemia, unspecified hyperlipidemia type

## 2017-01-28 NOTE — PROGRESS NOTE ADULT - PROBLEM SELECTOR PROBLEM 5
BPH (benign prostatic hypertrophy)

## 2017-01-28 NOTE — PROGRESS NOTE ADULT - PROBLEM SELECTOR PROBLEM 1
Sepsis, due to unspecified organism
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
## 2017-01-28 NOTE — PROGRESS NOTE ADULT - PROBLEM SELECTOR PLAN 1
Continue Ceftriaxone, follow up cultures.   Urine culture was never collected - to be done today - if sensitive to po abx can be discharged home - discussed with daughter
Continue Ceftriaxone, follow up cultures. Awaiting CBC.
Leukocytosis improving - continue to monitor  Continue Ceftriaxone, follow up cultures.   Urine culture wasn't collected on admission - found to be negative  dc plan in am or Monday 1/30
Sepsis secondary to UTI, continue Ceftriaxone. Follow up cultures. BP stable at this time. Monitor WBC.

## 2017-01-29 ENCOUNTER — TRANSCRIPTION ENCOUNTER (OUTPATIENT)
Age: 82
End: 2017-01-29

## 2017-01-29 VITALS
SYSTOLIC BLOOD PRESSURE: 115 MMHG | HEART RATE: 70 BPM | TEMPERATURE: 98 F | OXYGEN SATURATION: 99 % | RESPIRATION RATE: 18 BRPM | DIASTOLIC BLOOD PRESSURE: 64 MMHG

## 2017-01-29 LAB
ANISOCYTOSIS BLD QL: SLIGHT — SIGNIFICANT CHANGE UP
BASOPHILS # BLD AUTO: 0 K/UL — SIGNIFICANT CHANGE UP (ref 0–0.2)
BASOPHILS NFR BLD AUTO: 1 % — SIGNIFICANT CHANGE UP (ref 0–2)
EOSINOPHIL # BLD AUTO: 0.2 K/UL — SIGNIFICANT CHANGE UP (ref 0–0.5)
EOSINOPHIL NFR BLD AUTO: 1 % — SIGNIFICANT CHANGE UP (ref 0–6)
HBV CORE AB SER-ACNC: REACTIVE
HBV CORE IGM SER-ACNC: SIGNIFICANT CHANGE UP
HBV SURFACE AB SER-ACNC: 817.7 MIU/ML — SIGNIFICANT CHANGE UP
HCT VFR BLD CALC: 28.5 % — LOW (ref 42–52)
HGB BLD-MCNC: 8.7 G/DL — LOW (ref 14–18)
HYPOCHROMIA BLD QL: SLIGHT — SIGNIFICANT CHANGE UP
LYMPHOCYTES # BLD AUTO: 61 % — HIGH (ref 20–55)
MACROCYTES BLD QL: SLIGHT — SIGNIFICANT CHANGE UP
MCHC RBC-ENTMCNC: 30.5 G/DL — LOW (ref 32–36)
MCHC RBC-ENTMCNC: 30.5 PG — SIGNIFICANT CHANGE UP (ref 27–31)
MCV RBC AUTO: 100 FL — HIGH (ref 80–94)
MICROCYTES BLD QL: SLIGHT — SIGNIFICANT CHANGE UP
MONOCYTES NFR BLD AUTO: 6 % — SIGNIFICANT CHANGE UP (ref 3–10)
NEUTROPHILS NFR BLD AUTO: 14 % — LOW (ref 37–73)
PLAT MORPH BLD: NORMAL — SIGNIFICANT CHANGE UP
PLATELET # BLD AUTO: 113 K/UL — LOW (ref 150–400)
POIKILOCYTOSIS BLD QL AUTO: SLIGHT — SIGNIFICANT CHANGE UP
POLYCHROMASIA BLD QL SMEAR: SLIGHT — SIGNIFICANT CHANGE UP
RBC # BLD: 2.85 M/UL — LOW (ref 4.6–6.2)
RBC # FLD: 14.1 % — SIGNIFICANT CHANGE UP (ref 11–15.6)
RBC BLD AUTO: ABNORMAL
SMUDGE CELLS # BLD: PRESENT — SIGNIFICANT CHANGE UP
VARIANT LYMPHS # BLD: 17 % — HIGH (ref 0–6)
WBC # BLD: 25.38 K/UL — HIGH (ref 4.8–10.8)
WBC # FLD AUTO: 25.38 K/UL — HIGH (ref 4.8–10.8)

## 2017-01-29 PROCEDURE — 80048 BASIC METABOLIC PNL TOTAL CA: CPT

## 2017-01-29 PROCEDURE — 84100 ASSAY OF PHOSPHORUS: CPT

## 2017-01-29 PROCEDURE — 99261: CPT

## 2017-01-29 PROCEDURE — 83550 IRON BINDING TEST: CPT

## 2017-01-29 PROCEDURE — 84484 ASSAY OF TROPONIN QUANT: CPT

## 2017-01-29 PROCEDURE — 84436 ASSAY OF TOTAL THYROXINE: CPT

## 2017-01-29 PROCEDURE — 99285 EMERGENCY DEPT VISIT HI MDM: CPT | Mod: 25

## 2017-01-29 PROCEDURE — 36415 COLL VENOUS BLD VENIPUNCTURE: CPT

## 2017-01-29 PROCEDURE — 70450 CT HEAD/BRAIN W/O DYE: CPT

## 2017-01-29 PROCEDURE — 84443 ASSAY THYROID STIM HORMONE: CPT

## 2017-01-29 PROCEDURE — 86705 HEP B CORE ANTIBODY IGM: CPT

## 2017-01-29 PROCEDURE — 93005 ELECTROCARDIOGRAM TRACING: CPT

## 2017-01-29 PROCEDURE — 97163 PT EVAL HIGH COMPLEX 45 MIN: CPT

## 2017-01-29 PROCEDURE — 82607 VITAMIN B-12: CPT

## 2017-01-29 PROCEDURE — 87040 BLOOD CULTURE FOR BACTERIA: CPT

## 2017-01-29 PROCEDURE — 81001 URINALYSIS AUTO W/SCOPE: CPT

## 2017-01-29 PROCEDURE — 86140 C-REACTIVE PROTEIN: CPT

## 2017-01-29 PROCEDURE — 82550 ASSAY OF CK (CPK): CPT

## 2017-01-29 PROCEDURE — 85027 COMPLETE CBC AUTOMATED: CPT

## 2017-01-29 PROCEDURE — 83036 HEMOGLOBIN GLYCOSYLATED A1C: CPT

## 2017-01-29 PROCEDURE — 85610 PROTHROMBIN TIME: CPT

## 2017-01-29 PROCEDURE — 86803 HEPATITIS C AB TEST: CPT

## 2017-01-29 PROCEDURE — 86706 HEP B SURFACE ANTIBODY: CPT

## 2017-01-29 PROCEDURE — 87340 HEPATITIS B SURFACE AG IA: CPT

## 2017-01-29 PROCEDURE — 99239 HOSP IP/OBS DSCHRG MGMT >30: CPT

## 2017-01-29 PROCEDURE — 87086 URINE CULTURE/COLONY COUNT: CPT

## 2017-01-29 PROCEDURE — 71045 X-RAY EXAM CHEST 1 VIEW: CPT

## 2017-01-29 PROCEDURE — 85652 RBC SED RATE AUTOMATED: CPT

## 2017-01-29 PROCEDURE — 83735 ASSAY OF MAGNESIUM: CPT

## 2017-01-29 PROCEDURE — 84480 ASSAY TRIIODOTHYRONINE (T3): CPT

## 2017-01-29 PROCEDURE — 83605 ASSAY OF LACTIC ACID: CPT

## 2017-01-29 PROCEDURE — 80053 COMPREHEN METABOLIC PANEL: CPT

## 2017-01-29 PROCEDURE — 84466 ASSAY OF TRANSFERRIN: CPT

## 2017-01-29 PROCEDURE — 86704 HEP B CORE ANTIBODY TOTAL: CPT

## 2017-01-29 RX ORDER — CIPROFLOXACIN LACTATE 400MG/40ML
1 VIAL (ML) INTRAVENOUS
Qty: 10 | Refills: 0 | OUTPATIENT
Start: 2017-01-29 | End: 2017-02-08

## 2017-01-29 RX ADMIN — SODIUM CHLORIDE 3 MILLILITER(S): 9 INJECTION INTRAMUSCULAR; INTRAVENOUS; SUBCUTANEOUS at 13:32

## 2017-01-29 RX ADMIN — HEPARIN SODIUM 5000 UNIT(S): 5000 INJECTION INTRAVENOUS; SUBCUTANEOUS at 07:04

## 2017-01-29 RX ADMIN — FINASTERIDE 5 MILLIGRAM(S): 5 TABLET, FILM COATED ORAL at 11:18

## 2017-01-29 RX ADMIN — Medication 4: at 11:47

## 2017-01-29 RX ADMIN — SODIUM CHLORIDE 3 MILLILITER(S): 9 INJECTION INTRAMUSCULAR; INTRAVENOUS; SUBCUTANEOUS at 07:05

## 2017-01-29 RX ADMIN — Medication 81 MILLIGRAM(S): at 11:18

## 2017-01-29 RX ADMIN — Medication 1 MILLIGRAM(S): at 11:18

## 2017-01-29 NOTE — DISCHARGE NOTE ADULT - PLAN OF CARE
completion of treatment complete antibiotics course  advised daughter to have outpatient urology evaluation as above outpatient follow up with oncologist and urologist outpatient follow up with oncologist continue home meds continue dialysis as per renal low sodium diet and home meds

## 2017-01-29 NOTE — DISCHARGE NOTE ADULT - IF YOU ARE A SMOKER, IT IS IMPORTANT FOR YOUR HEALTH TO STOP SMOKING. PLEASE BE AWARE THAT SECOND HAND SMOKE IS ALSO HARMFUL.
Contacted patient to inform him of this.  Patient stated understanding.  Leisa Khan LPN   Statement Selected

## 2017-01-29 NOTE — DISCHARGE NOTE ADULT - MEDICATION SUMMARY - MEDICATIONS TO CHANGE
I will SWITCH the dose or number of times a day I take the medications listed below when I get home from the hospital:    folic acid  --  by mouth

## 2017-01-29 NOTE — DISCHARGE NOTE ADULT - MEDICATION SUMMARY - MEDICATIONS TO TAKE
I will START or STAY ON the medications listed below when I get home from the hospital:    finasteride  --  by mouth   -- Indication: For BPH (benign prostatic hypertrophy)    aspirin 81 mg oral tablet  -- 1 tab(s) by mouth once a day  -- Indication: For Hypertension    insulin  --     -- Indication: For Diabetes    atorvastatin 40 mg oral tablet  -- 1 tab(s) by mouth once a day  -- Indication: For HLD    metoprolol tartrate 50 mg oral tablet  -- 1 tab(s) by mouth 2 times a day  -- Indication: For HTN    Levaquin 500 mg oral tablet  -- 1 tab(s) by mouth every 24 hours  -- Avoid prolonged or excessive exposure to direct and/or artificial sunlight while taking this medication.  Do not take dairy products, antacids, or iron preparations within one hour of this medication.  Finish all this medication unless otherwise directed by prescriber.  May cause drowsiness or dizziness.  Medication should be taken with plenty of water.    -- Indication: For UTI    folic acid 1 mg oral tablet  -- 1 tab(s) by mouth 2 times a day  -- Indication: For Supplement

## 2017-01-29 NOTE — DISCHARGE NOTE ADULT - CARE PROVIDER_API CALL
[facility]  [facility]  Phone: (   )    -  Fax: (   )    -

## 2017-01-29 NOTE — DISCHARGE NOTE ADULT - SECONDARY DIAGNOSIS.
Sepsis, due to unspecified organism Prostate cancer Lymphoma, unspecified body region, unspecified lymphoma type Type 2 diabetes mellitus with other kidney complication ESRD (end stage renal disease) Essential hypertension

## 2017-01-29 NOTE — DISCHARGE NOTE ADULT - CARE PLAN
Principal Discharge DX:	Urinary tract infection with hematuria, site unspecified  Goal:	completion of treatment  Instructions for follow-up, activity and diet:	complete antibiotics course  advised daughter to have outpatient urology evaluation  Secondary Diagnosis:	Sepsis, due to unspecified organism  Instructions for follow-up, activity and diet:	as above  Secondary Diagnosis:	Prostate cancer  Instructions for follow-up, activity and diet:	outpatient follow up with oncologist and urologist  Secondary Diagnosis:	Lymphoma, unspecified body region, unspecified lymphoma type  Instructions for follow-up, activity and diet:	outpatient follow up with oncologist  Secondary Diagnosis:	Type 2 diabetes mellitus with other kidney complication  Instructions for follow-up, activity and diet:	continue home meds  Secondary Diagnosis:	ESRD (end stage renal disease)  Instructions for follow-up, activity and diet:	continue dialysis as per renal  Secondary Diagnosis:	Essential hypertension  Instructions for follow-up, activity and diet:	low sodium diet and home meds

## 2017-01-29 NOTE — DISCHARGE NOTE ADULT - MEDICATION SUMMARY - MEDICATIONS TO STOP TAKING
I will STOP taking the medications listed below when I get home from the hospital:    Cipro 250 mg oral tablet  -- 1 tab(s) by mouth 2 times a day  -- Avoid prolonged or excessive exposure to direct and/or artificial sunlight while taking this medication.  Check with your doctor before becoming pregnant.  Do not take dairy products, antacids, or iron preparations within one hour of this medication.  Finish all this medication unless otherwise directed by prescriber.  Medication should be taken with plenty of water.

## 2017-01-29 NOTE — DISCHARGE NOTE ADULT - PATIENT PORTAL LINK FT
“You can access the Follow[facility] Patient Portal, offered by [facility], by registering with the following website: [email or web address]”
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
## 2017-01-29 NOTE — PROGRESS NOTE ADULT - SUBJECTIVE AND OBJECTIVE BOX
Patient seen and examined    Feels much better      REVIEW OF SYSTEMS:    CONSTITUTIONAL: No F/C NVD SOB  appears fully in command      Vital Signs Last 24 Hrs  T(C): 36.9, Max: 37.2 (01-28 @ 23:36)  T(F): 98.5, Max: 99 (01-28 @ 23:36)  HR: 70 (70 - 75)  BP: 115/64 (99/59 - 115/64)  BP(mean): --  RR: 18 (15 - 18)  SpO2: 99% (97% - 99%)    PHYSICAL EXAM:    GENERAL: NAD,   EYES:  conjunctiva and sclera clear  NECK: Supple, No JVD/Bruit  NERVOUS SYSTEM:  A/O x3, moving all extr  CHEST:  CTA ,No rales orrhonchi  HEART:  RRR, No murmurs  ABDOMEN: Soft, NT/ND BS+  EXTREMITIES:  No C/C/Edema; NT  SKIN: No rashes    LABS:                        8.7    25.38 )-----------( 113      ( 29 Jan 2017 06:11 )             28.5     28 Jan 2017 12:44    135    |  96     |  32.0   ----------------------------<  188    4.2     |  27.0   |  4.49     Ca    8.5        28 Jan 2017 12:44  Phos  2.9       28 Jan 2017 12:44        MEDICATIONS  (STANDING):  heparin  Injectable  sodium chloride 0.9% lock flush  ondansetron Injectable PRN  insulin lispro (HumaLOG) corrective regimen sliding scale  dextrose 5%.  dextrose Gel PRN  dextrose 50% Injectable  dextrose 50% Injectable  dextrose 50% Injectable  glucagon  Injectable PRN  cefTRIAXone   IVPB  cefTRIAXone   IVPB  atorvastatin  finasteride  aspirin enteric coated  folic acid  epoetin sylvie Injectable

## 2017-01-30 LAB
CULTURE RESULTS: SIGNIFICANT CHANGE UP
CULTURE RESULTS: SIGNIFICANT CHANGE UP
SPECIMEN SOURCE: SIGNIFICANT CHANGE UP
SPECIMEN SOURCE: SIGNIFICANT CHANGE UP

## 2019-07-31 NOTE — ED ADULT NURSE NOTE - GENITOURINARY ASSESSMENT
Mom states they are running low on supply. They use the nebulizer whenever he has a cough or stuffy with allergies. Ok to refill? WDL

## 2021-09-29 NOTE — ED ADULT NURSE NOTE - INTEGUMENTARY WDL
Visit Information    Have you changed or started any medications since your last visit including any over-the-counter medicines, vitamins, or herbal medicines? no   Are you having any side effects from any of your medications? -  no  Have you stopped taking any of your medications? Is so, why? -  no    Have you seen any other physician or provider since your last visit? No  Have you had any other diagnostic tests since your last visit? No  Have you been seen in the emergency room and/or had an admission to a hospital since we last saw you? No  Have you had your routine dental cleaning in the past 6 months? no    Have you activated your [employer] account? If not, what are your barriers?  Yes     Patient Care Team:  Mery Culver PA-C as PCP - General (Physician Assistant)  Mery Culver PA-C as PCP - [facility] Provider    Medical History Review  Past Medical, Family, and Social History reviewed and  contribute to the patient presenting condition    Health Maintenance   Topic Date Due    Hepatitis C screen  Never done    HIV screen  Never done    Flu vaccine (1) Never done    DTaP/Tdap/Td vaccine (1 - Tdap) 04/13/2022 (Originally 10/28/1997)    Cervical cancer screen  03/25/2022    A1C test (Diabetic or Prediabetic)  04/24/2022    Potassium monitoring  08/25/2022    Creatinine monitoring  08/25/2022    Lipid screen  04/24/2026    COVID-19 Vaccine  Completed    Hepatitis A vaccine  Aged Out    Hepatitis B vaccine  Aged Out    Hib vaccine  Aged Out    Meningococcal (ACWY) vaccine  Aged Out    Pneumococcal 0-64 years Vaccine  Aged Out Color consistent with ethnicity/race, warm, dry intact, resilient.
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
## 2023-01-24 NOTE — H&P ADULT. - PROBLEM SELECTOR PLAN 1
3 Patient meets sepsis criteria with positive UA, leukocytosis, episode of hypotension. Will check lactate, rebolus if elevated, cont. IV abx pending urine cx, f/u blood cx, chest xray with no infiltrate. Probiotics, ambulation, dvt-P

## 2024-02-13 NOTE — CONSULT NOTE ADULT - CONSULT REQUESTED BY NAME
Philip   Mp Krishnan MD  Interventional Cardiology / Endovascular Specialist  [place] Office : [phone] [place] [id number] Tel:   [place] Office : [address] [facility] [address] [address]  Tel: [phone]      Subjective/Overnight events: Patient lying in bed remains intubated on pressor  	  MEDICATIONS:  heparin   Injectable 5000 Unit(s) SubCutaneous every 12 hours  midodrine 30 milliGRAM(s) Oral every 8 hours  norepinephrine Infusion 0.05 MICROgram(s)/kG/Min IV Continuous <Continuous>    doxycycline IVPB      doxycycline IVPB 100 milliGRAM(s) IV Intermittent every 12 hours  meropenem  IVPB 500 milliGRAM(s) IV Intermittent every 24 hours      acetaminophen   Oral Liquid .. 650 milliGRAM(s) Oral every 6 hours PRN  fentaNYL   Infusion. 0.5 MICROgram(s)/kG/Hr IV Continuous <Continuous>  HYDROmorphone  Injectable 1 milliGRAM(s) IV Push every 4 hours PRN  propofol Infusion 5 MICROgram(s)/kG/Min IV Continuous <Continuous>    pantoprazole   Suspension 40 milliGRAM(s) Oral daily    dextrose 50% Injectable 12.5 Gram(s) IV Push once  dextrose 50% Injectable 12.5 Gram(s) IV Push once  dextrose 50% Injectable 25 Gram(s) IV Push once  dextrose 50% Injectable 25 Gram(s) IV Push once  dextrose 50% Injectable 25 Gram(s) IV Push once  dextrose 50% Injectable 25 Gram(s) IV Push once  dextrose Oral Gel 15 Gram(s) Oral once PRN  dextrose Oral Gel 15 Gram(s) Oral once PRN  glucagon  Injectable 1 milliGRAM(s) IntraMuscular once  glucagon  Injectable 1 milliGRAM(s) IntraMuscular once  insulin glargine Injectable (LANTUS) 6 Unit(s) SubCutaneous every morning  insulin lispro (ADMELOG) corrective regimen sliding scale   SubCutaneous every 6 hours    chlorhexidine 0.12% Liquid 15 milliLiter(s) Oral Mucosa every 12 hours  Dakins Solution - 1/2 Strength 1 Application(s) Topical two times a day  dextrose 5%. 1000 milliLiter(s) IV Continuous <Continuous>  dextrose 5%. 1000 milliLiter(s) IV Continuous <Continuous>  dextrose 5%. 1000 milliLiter(s) IV Continuous <Continuous>  dextrose 5%. 1000 milliLiter(s) IV Continuous <Continuous>  sodium bicarbonate 1300 milliGRAM(s) Oral three times a day      PAST MEDICAL/SURGICAL HISTORY  PAST MEDICAL & SURGICAL HISTORY:  Angina  in 2005, s/p angioplasty with stent placement, no recurrance, no sequalae      Diabetes Mellitus  type 2      Arthritis, Infective, Knee      Detached Retina  right eye      Acute Mucous Pneumonia  4/2010, resolved ; no residual problems      Spinal Stenosis- lumbar      History of Back Surgery  2010      Basal Cell Cancer  removed from left neck 2009      Dementia      Detached Retina, Left  laser surgery in 1995      S/P Carpal Tunnel Release  bilateral hands in 1990      Trigger Finger  release of middle and ring finger of right hand in 1990, and middle finger left hand in 2008      S/P Laparoscopic Cholecystectomy  2008      S/P TKR (Total Knee Replacement)  left knee      Cataract  removal with lens implant right in 2000          SOCIAL HISTORY: Substance Use (street drugs): ( x ) never used  (  ) other:    FAMILY HISTORY:  FH: HTN (hypertension)        PHYSICAL EXAM:  T(C): 37.8 (02-13-24 @ 12:00), Max: 37.8 (02-13-24 @ 04:00)  HR: 88 (02-13-24 @ 12:13) (85 - 99)  BP: 47/26 (02-13-24 @ 12:00) (47/26 - 72/25)  RR: 16 (02-13-24 @ 12:00) (14 - 20)  SpO2: 100% (02-13-24 @ 12:13) (98% - 100%)  Wt(kg): --  I&O's Summary    12 Feb 2024 07:01  -  13 Feb 2024 07:00  --------------------------------------------------------  IN: 3017.3 mL / OUT: 335 mL / NET: 2682.3 mL    13 Feb 2024 07:01  -  13 Feb 2024 15:09  --------------------------------------------------------  IN: 192.4 mL / OUT: 10 mL / NET: 182.4 mL          GENERAL: NAD  EYES:  conjunctiva and sclera clear  ENMT: No tonsillar erythema, exudates, or enlargement  Cardiovascular: Normal S1 S2, No JVD, No murmurs, No edema  Respiratory: Lungs clear to auscultation	  Gastrointestinal:  Soft,   Extremities: No edema                                     7.3    16.68 )-----------( 125      ( 13 Feb 2024 00:10 )             22.7     02-13    140  |  97<L>  |  113<H>  ----------------------------<  206<H>  4.8   |  22  |  3.74<H>    Ca    6.3<LL>      13 Feb 2024 00:10  Phos  4.5     02-13  Mg     1.80     02-13    TPro  4.6<L>  /  Alb  1.4<L>  /  TBili  0.3  /  DBili  x   /  AST  19  /  ALT  9   /  AlkPhos  424<H>  02-13    proBNP:   Lipid Profile:   HgA1c:   TSH:     Consultant(s) Notes Reviewed:  [x ] YES  [ ] NO    Care Discussed with Consultants/Other Providers [ x] YES  [ ] NO    Imaging Personally Reviewed independently:  [x] YES  [ ] NO    All labs, radiologic studies, vitals, orders and medications list reviewed. Patient is seen and examined at bedside. Case discussed with medical team.

## 2024-09-13 NOTE — H&P ADULT. - PROBLEM SELECTOR PLAN 5
A responsible adult should stay with you and you should rest quietly for the rest of the day.    Do not drink alcohol, drive, operate any heavy machinery or power tools or make any legal/important decisions for the next 24 hours.     Progress your diet as tolerated.  If you begin to experience severe pain, increased shortness of breath, racing heartbeat or a fever above 101 F, seek immediate medical attention.     Follow up with MD as instructed. Call office for results in 3 to 5 days if needed. [phone]    Impression:     EGD Findings:   1.  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  Cold forcep biopsies were taken  2.  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  Normal gastric mucosa entire stomach, cold forcep biopsies were taken for H. pylori  4.  Normal duodenal mucosa visualized to D3     EUS Findings:   1.  Normal pancreatic uncinate, head, neck, body, tail  2.  Normal pancreatic duct size throughout the entire pancreas  3.  Dilated bile duct up to 7.3 mm with no stones or sludge, however there is abrupt cut off at an enlarged ampulla with a very long ampullary tract visualized on ultrasound that is tortuous and narrow.  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  Status post cholecystectomy  5.  Normal left and right lobe of the liver  6.  Normal celiac axis        Recommendations:  1.  Follow-up biopsy results and treat H. pylori if positive  2.  Repeat EGD in 3 years if biopsies show Amezcua's esophagus with no dysplasia  3.  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  He has had 1-2 episodes per year over the last 3 years with no source found.  Given his a larger ampulla with torturous tract and dilated duct, this certainly could be the cause.  Autoimmune workup has been negative so far.  4.  Consider  ERCP with sphincterotomy to help with ampullary stenosis.  This was discussed with the patient and his wife in depth today after the procedure.  They will think about which route they would like to go.  He should follow-up in clinic        cont. proscar

## 2024-11-26 NOTE — ED PROVIDER NOTE - CROS ED SKIN ALL NEG
Problem: Discharge Planning  Goal: Discharge to home or other facility with appropriate resources  11/26/2024 0906 by Alvina Askew, RN  Outcome: Progressing     Problem: Safety - Adult  Goal: Free from fall injury  Outcome: Progressing      negative...

## 2024-12-05 NOTE — DISCHARGE NOTE ADULT - FUNCTIONAL SCREEN CURRENT LEVEL: BATHING, MLM
Follow up with your primary care provider in 2 days. Return to the emergency department for any increase in symptoms or for any other new or worrisome symptoms.     
(2) assistive person